# Patient Record
Sex: FEMALE | Race: OTHER | HISPANIC OR LATINO | URBAN - METROPOLITAN AREA
[De-identification: names, ages, dates, MRNs, and addresses within clinical notes are randomized per-mention and may not be internally consistent; named-entity substitution may affect disease eponyms.]

---

## 2021-01-19 ENCOUNTER — OUTPATIENT (OUTPATIENT)
Dept: OUTPATIENT SERVICES | Facility: HOSPITAL | Age: 41
LOS: 1 days | End: 2021-01-19
Payer: COMMERCIAL

## 2021-01-19 DIAGNOSIS — Z20.828 CONTACT WITH AND (SUSPECTED) EXPOSURE TO OTHER VIRAL COMMUNICABLE DISEASES: ICD-10-CM

## 2021-01-19 LAB — SARS-COV-2 RNA SPEC QL NAA+PROBE: SIGNIFICANT CHANGE UP

## 2021-01-19 PROCEDURE — U0003: CPT

## 2021-01-19 PROCEDURE — U0005: CPT

## 2021-01-19 PROCEDURE — C9803: CPT

## 2021-01-20 DIAGNOSIS — Z20.828 CONTACT WITH AND (SUSPECTED) EXPOSURE TO OTHER VIRAL COMMUNICABLE DISEASES: ICD-10-CM

## 2021-01-23 ENCOUNTER — OUTPATIENT (OUTPATIENT)
Dept: OUTPATIENT SERVICES | Facility: HOSPITAL | Age: 41
LOS: 1 days | End: 2021-01-23
Payer: COMMERCIAL

## 2021-01-23 DIAGNOSIS — Z20.828 CONTACT WITH AND (SUSPECTED) EXPOSURE TO OTHER VIRAL COMMUNICABLE DISEASES: ICD-10-CM

## 2021-01-23 LAB — SARS-COV-2 RNA SPEC QL NAA+PROBE: SIGNIFICANT CHANGE UP

## 2021-01-23 PROCEDURE — U0003: CPT

## 2021-01-23 PROCEDURE — C9803: CPT

## 2021-01-23 PROCEDURE — U0005: CPT

## 2021-01-24 DIAGNOSIS — Z20.828 CONTACT WITH AND (SUSPECTED) EXPOSURE TO OTHER VIRAL COMMUNICABLE DISEASES: ICD-10-CM

## 2024-01-14 ENCOUNTER — EMERGENCY (EMERGENCY)
Facility: HOSPITAL | Age: 44
LOS: 0 days | Discharge: HOME | End: 2024-01-14
Attending: STUDENT IN AN ORGANIZED HEALTH CARE EDUCATION/TRAINING PROGRAM
Payer: COMMERCIAL

## 2024-01-14 VITALS — WEIGHT: 149.91 LBS | HEIGHT: 63 IN

## 2024-01-14 VITALS — RESPIRATION RATE: 16 BRPM

## 2024-01-14 DIAGNOSIS — Z20.822 CONTACT WITH AND (SUSPECTED) EXPOSURE TO COVID-19: ICD-10-CM

## 2024-01-14 DIAGNOSIS — J02.9 ACUTE PHARYNGITIS, UNSPECIFIED: ICD-10-CM

## 2024-01-14 DIAGNOSIS — R05.9 COUGH, UNSPECIFIED: ICD-10-CM

## 2024-01-14 DIAGNOSIS — R07.0 PAIN IN THROAT: ICD-10-CM

## 2024-01-14 LAB
FLUAV AG NPH QL: DETECTED
FLUAV AG NPH QL: DETECTED
FLUBV AG NPH QL: SIGNIFICANT CHANGE UP
FLUBV AG NPH QL: SIGNIFICANT CHANGE UP
RSV RNA NPH QL NAA+NON-PROBE: SIGNIFICANT CHANGE UP
RSV RNA NPH QL NAA+NON-PROBE: SIGNIFICANT CHANGE UP
SARS-COV-2 RNA SPEC QL NAA+PROBE: SIGNIFICANT CHANGE UP
SARS-COV-2 RNA SPEC QL NAA+PROBE: SIGNIFICANT CHANGE UP

## 2024-01-14 PROCEDURE — 99283 EMERGENCY DEPT VISIT LOW MDM: CPT | Mod: 25

## 2024-01-14 PROCEDURE — 99284 EMERGENCY DEPT VISIT MOD MDM: CPT

## 2024-01-14 PROCEDURE — 0241U: CPT

## 2024-01-14 PROCEDURE — 96372 THER/PROPH/DIAG INJ SC/IM: CPT

## 2024-01-14 RX ORDER — KETOROLAC TROMETHAMINE 30 MG/ML
15 SYRINGE (ML) INJECTION ONCE
Refills: 0 | Status: DISCONTINUED | OUTPATIENT
Start: 2024-01-14 | End: 2024-01-14

## 2024-01-14 RX ORDER — IBUPROFEN 200 MG
1 TABLET ORAL
Qty: 20 | Refills: 0
Start: 2024-01-14 | End: 2024-01-18

## 2024-01-14 RX ADMIN — Medication 15 MILLIGRAM(S): at 15:09

## 2024-01-14 NOTE — ED STATDOCS - NSFOLLOWUPCLINICS_GEN_ALL_ED_FT
[facility]  Family Medicine  [address] [place] [address]  Phone: [phone]  Fax:      [facility]  Family Medicine  [address] [place] [address]  Phone: [phone]  Fax:

## 2024-01-14 NOTE — ED STATDOCS - PATIENT PORTAL LINK FT
You can access the FollowMyHealth Patient Portal offered by [facility] by registering at the following website: [email or web address]. By joining [employer]’s FollowMyHealth portal, you will also be able to view your health information using other applications (apps) compatible with our system. You can access the FollowMyHealth Patient Portal offered by [facility] by registering at the following website: [email or web address]. By joining [employer]’s FollowMyHealth portal, you will also be able to view your health information using other applications (apps) compatible with our system.

## 2024-01-14 NOTE — ED ADULT NURSE NOTE - NSFALLUNIVINTERV_ED_ALL_ED
Bed/Stretcher in lowest position, wheels locked, appropriate side rails in place/Call bell, personal items and telephone in reach/Instruct patient to call for assistance before getting out of bed/chair/stretcher/Non-slip footwear applied when patient is off stretcher/[place] to call system/Physically safe environment - no spills, clutter or unnecessary equipment/Purposeful proactive rounding/Room/bathroom lighting operational, light cord in reach Bed/Stretcher in lowest position, wheels locked, appropriate side rails in place/Call bell, personal items and telephone in reach/Instruct patient to call for assistance before getting out of bed/chair/stretcher/Non-slip footwear applied when patient is off stretcher/[place] to call system/Physically safe environment - no spills, clutter or unnecessary equipment/Purposeful proactive rounding/Room/bathroom lighting operational, light cord in reach

## 2024-01-14 NOTE — ED STATDOCS - OBJECTIVE STATEMENT
44 y/o F with PMHx of presents to the ED c/o throat pain x1 day. Pt unsure of fevers. Endorses cough with green sputum.

## 2024-01-14 NOTE — ED STATDOCS - NSFOLLOWUPINSTRUCTIONS_ED_ALL_ED_FT
Faringitis    LO QUE NECESITA SABER:    ¿Qué es la faringitis?La faringitis o dolor de garganta es la inflamación de los tejidos y estructuras en schwartz faringe (garganta). La faringitis es generalmente causada por chance bacteria o un virus. Otras causas incluyen el fumar, las alergias o el reflujo estomacal.    ¿Cuáles son los signos y síntomas de la faringitis?Los signos y síntomas dependen de la causa de la faringitis. Puede presentar cualquiera de los siguientes signos o síntomas:    Dolor de garganta o dolor al tragar    Fiebre, escalofríos y nitish corporales    Ronquera o voz áspera    Tos, flujo o congestión nasal, comezón en los ojos u ojos llorosos    Dolor de kecia    Malestar estomacal y pérdida de apetito    Manchas blanquecinas-jasmina en la parte posterior de la garganta    Bultos sensibles e inflamados en los costados del kacey  ¿Cómo se diagnostica la faringitis?Informe a schwartz médico acerca de rain síntomas y de cuándo comenzaron. El médico podría revisar por dentro de la garganta y palparle el kacey. También es posible que deba hacerse los siguientes exámenes:    Un cultivo de gargantapuede detectar el germen que está causándole el dolor de garganta. Un cultivo de garganta se realiza frotando un hisopo de algodón contra la parte posterior de schwartz garganta.    Los análisis de sangrese podrían usar para mostrar si alguna otra condición médica está causando schwartz dolor de garganta.  ¿Cómo se trata la faringitis?La faringitis viral desaparecerá por sí nadine sin necesidad de tratamiento. El dolor de garganta debería empezar a mejorar en 3 a 5 días. Podría necesitar medicamentos para aliviar el dolor y la hinchazón o para tratar chance infección bacteriana.    ¿Cómo puedo controlar los síntomas?    Ilya gárgaras de agua con sal.Mezcle ¼ de cucharadita de sal en un vaso con 8 onzas de agua tibia y ilya gárgaras. No lo trague. El agua salada podría ayudar a reducir la hinchazón de la garganta.    [place] líquidos regina se le haya indicado.Es posible que usted necesite ingerir más líquidos de lo habitual. Los líquidos pueden ayudar a aliviar schwartz garganta y prevenir la deshidratación. Pregunte cuánto líquido debe elizabeth cada día y cuáles líquidos son los más adecuados para usted.    Use un humidificador de vapor frío.[place] humidificará el aire y ayudará a que disminuya la tos.    Alivie schwartz garganta.Las pastillas para la tos, el hielo, los alimentos blandos o las paletas heladas pueden ayudar a disminuir el dolor de garganta.  ¿Cómo puedo prevenir la propagación de la faringitis?Cúbrase la boca y nariz cuando tose o estornuda. No comparta alimentos o bebidas. Lávese las luis antonio frecuentemente. Utilice agua y jabón. Si no tiene agua y jabón disponibles, entonces puede usar un gel antiséptico hidroalcohólico para luis antonio.  Lavado de luis antonio    Llame al número de emergencias local (911 en los [place]) si:    Tiene dificultad para respirar o tragar porque schwartz garganta está hinchada.    ¿Cuándo derek buscar atención inmediata?    Usted está babeando porque le duele demasiado tragar.    Usted tiene fiebre por encima de 102°F (39°C) o le dura más de 3 días.    Usted está confundido.    Siente gusto a radha.  ¿Cuándo derek llamar a mi médico?    Schwartz dolor de garganta empeora.    Usted tiene un bulto adolorido en schwartz garganta que no se tana después de 5 días.    Rain síntomas no mejoran después de 5 días.    Usted tiene preguntas o inquietudes acerca de schwartz condición o cuidado.  ACUERDOS SOBRE SCHWARTZ CUIDADO:    Usted tiene el derecho de ayudar a planear schwartz cuidado. Aprenda todo lo que pueda sobre schwartz condición y regina darle tratamiento. Discuta rain opciones de tratamiento con rain médicos para decidir el cuidado que usted desea recibir. Usted siempre tiene el derecho de rechazar el tratamiento. Faringitis    LO QUE NECESITA SABER:    ¿Qué es la faringitis?La faringitis o dolor de garganta es la inflamación de los tejidos y estructuras en schwartz faringe (garganta). La faringitis es generalmente causada por chance bacteria o un virus. Otras causas incluyen el fumar, las alergias o el reflujo estomacal.    ¿Cuáles son los signos y síntomas de la faringitis?Los signos y síntomas dependen de la causa de la faringitis. Puede presentar cualquiera de los siguientes signos o síntomas:    Dolor de garganta o dolor al tragar    Fiebre, escalofríos y nitish corporales    Ronquera o voz áspera    Tos, flujo o congestión nasal, comezón en los ojos u ojos llorosos    Dolor de kecia    Malestar estomacal y pérdida de apetito    Manchas blanquecinas-jasmina en la parte posterior de la garganta    Bultos sensibles e inflamados en los costados del kacey  ¿Cómo se diagnostica la faringitis?Informe a schwartz médico acerca de rain síntomas y de cuándo comenzaron. El médico podría revisar por dentro de la garganta y palparle el kacey. También es posible que deba hacerse los siguientes exámenes:    Un cultivo de gargantapuede detectar el germen que está causándole el dolor de garganta. Un cultivo de garganta se realiza frotando un hisopo de algodón contra la parte posterior de schwartz garganta.    Los análisis de sangrese podrían usar para mostrar si alguna otra condición médica está causando schwartz dolor de garganta.  ¿Cómo se trata la faringitis?La faringitis viral desaparecerá por sí nadine sin necesidad de tratamiento. El dolor de garganta debería empezar a mejorar en 3 a 5 días. Podría necesitar medicamentos para aliviar el dolor y la hinchazón o para tratar chance infección bacteriana.    ¿Cómo puedo controlar los síntomas?    Ilya gárgaras de agua con sal.Mezcle ¼ de cucharadita de sal en un vaso con 8 onzas de agua tibia y ilya gárgaras. No lo trague. El agua salada podría ayudar a reducir la hinchazón de la garganta.    [place] líquidos regina se le haya indicado.Es posible que usted necesite ingerir más líquidos de lo habitual. Los líquidos pueden ayudar a aliviar schwartz garganta y prevenir la deshidratación. Pregunte cuánto líquido debe elizabeth cada día y cuáles líquidos son los más adecuados para usted.    Use un humidificador de vapor frío.[place] humidificará el aire y ayudará a que disminuya la tos.    Alivie schwartz garganta.Las pastillas para la tos, el hielo, los alimentos blandos o las paletas heladas pueden ayudar a disminuir el dolor de garganta.  ¿Cómo puedo prevenir la propagación de la faringitis?Cúbrase la boca y nariz cuando tose o estornuda. No comparta alimentos o bebidas. Lávese las luis antonio frecuentemente. Utilice agua y jabón. Si no tiene agua y jabón disponibles, entonces puede usar un gel antiséptico hidroalcohólico para luis antonio.  Lavado de luis antonio    Llame al número de emergencias local (911 en los [place]) si:    Tiene dificultad para respirar o tragar porque schwartz garganta está hinchada.    ¿Cuándo derek buscar atención inmediata?    Usted está babeando porque le duele demasiado tragar.    Usted tiene fiebre por encima de 102°F (39°C) o le dura más de 3 días.    Usted está confundido.    Siente gusto a radha.  ¿Cuándo derek llamar a mi médico?    Schwartz dolor de garganta empeora.    Usted tiene un bulto adolorido en schwartz garganta que no se tana después de 5 días.    Rain síntomas no mejoran después de 5 días.    Usted tiene preguntas o inquietudes acerca de schwartz condición o cuidado.  ACUERDOS SOBRE SCHWARTZ CUIDADO:    Usted tiene el derecho de ayudar a planear schwartz cuidado. Aprenda todo lo que pueda sobre schwartz condición y regina darle tratamiento. Discuta rain opciones de tratamiento con rain médicos para decidir el cuidado que usted desea recibir. Usted siempre tiene el derecho de rechazar el tratamiento.

## 2024-01-14 NOTE — ED STATDOCS - CLINICAL SUMMARY MEDICAL DECISION MAKING FREE TEXT BOX
ddx: viral pharyngitis, flu like illness    Swab, Toradol, dc. ddx: viral pharyngitis, flu like illness    Swab, keshawn Maurice.        Will swab for COVID/FLU.  probable viral pharyngitis.  Ginette Duncan PA-C

## 2024-01-14 NOTE — ED STATDOCS - PROGRESS NOTE DETAILS
42 y/o F with PMHx of presents to the ED c/o throat pain x1 day. Pt unsure of fevers. Endorses cough with green sputum.  Ginette Duncan PA-C Will swab for COVID/FLU.  probable viral pharyngitis.  Ginette Duncan PA-C